# Patient Record
Sex: MALE | Race: WHITE | Employment: OTHER | URBAN - NONMETROPOLITAN AREA
[De-identification: names, ages, dates, MRNs, and addresses within clinical notes are randomized per-mention and may not be internally consistent; named-entity substitution may affect disease eponyms.]

---

## 2022-02-10 ENCOUNTER — OFFICE VISIT (OUTPATIENT)
Dept: ENT CLINIC | Age: 77
End: 2022-02-10
Payer: MEDICARE

## 2022-02-10 VITALS
OXYGEN SATURATION: 98 % | DIASTOLIC BLOOD PRESSURE: 62 MMHG | BODY MASS INDEX: 31.99 KG/M2 | HEIGHT: 69 IN | TEMPERATURE: 97.9 F | SYSTOLIC BLOOD PRESSURE: 138 MMHG | WEIGHT: 216 LBS | RESPIRATION RATE: 14 BRPM | HEART RATE: 58 BPM

## 2022-02-10 DIAGNOSIS — H91.90 HEARING LOSS, UNSPECIFIED HEARING LOSS TYPE, UNSPECIFIED LATERALITY: ICD-10-CM

## 2022-02-10 DIAGNOSIS — H93.13 TINNITUS OF BOTH EARS: Primary | ICD-10-CM

## 2022-02-10 PROCEDURE — 99203 OFFICE O/P NEW LOW 30 MIN: CPT | Performed by: OTOLARYNGOLOGY

## 2022-02-10 RX ORDER — B-COMPLEX WITH VITAMIN C
TABLET ORAL
COMMUNITY
End: 2022-02-15

## 2022-02-10 RX ORDER — LOSARTAN POTASSIUM 100 MG/1
TABLET ORAL
COMMUNITY

## 2022-02-10 RX ORDER — ATORVASTATIN CALCIUM 10 MG/1
TABLET, FILM COATED ORAL DAILY
COMMUNITY
End: 2022-02-10

## 2022-02-10 RX ORDER — CARVEDILOL 12.5 MG/1
12.5 TABLET ORAL 2 TIMES DAILY WITH MEALS
COMMUNITY

## 2022-02-10 RX ORDER — ATORVASTATIN CALCIUM 10 MG/1
TABLET, FILM COATED ORAL
COMMUNITY
End: 2022-02-15

## 2022-02-10 ASSESSMENT — ENCOUNTER SYMPTOMS
SORE THROAT: 0
SINUS PRESSURE: 0
DIARRHEA: 0
CHOKING: 0
CHEST TIGHTNESS: 0
TROUBLE SWALLOWING: 0
RHINORRHEA: 0
NAUSEA: 0
COLOR CHANGE: 0
VOMITING: 0
APNEA: 1
FACIAL SWELLING: 0
WHEEZING: 0
COUGH: 0
ABDOMINAL PAIN: 0
SHORTNESS OF BREATH: 1
VOICE CHANGE: 0
STRIDOR: 0

## 2022-02-10 NOTE — PROGRESS NOTES
[facility] [place] [facility]  [facility] EAR, NOSE AND THROAT  [facility]  Dept: [phone]  Dept Fax: [phone]  Loc: [address] David Montoya is a 64 y.o. male who was referred byMaryan Heath* for:  Chief Complaint   Patient presents with    New Patient     patient is here for tinnitus, bilateral   .    HPI:     Brice Gonzalez is a 64 y.o. male who presents today for relation of tinnitus in both ears. He states it has been bothering him for about a year. He thinks he hears fine. His wife is accompanying him and she says he does not. He does admit to some noise exposure without ear protection. He has not had vertigo pain or ear drainage. History: Allergies   Allergen Reactions    Cephalexin Hives and Rash    Hydroxyzine Hives     Current Outpatient Medications   Medication Sig Dispense Refill    atorvastatin (LIPITOR) 10 MG tablet       losartan (COZAAR) 100 MG tablet       metFORMIN (GLUCOPHAGE) 500 MG tablet       B Complex Vitamins (VITAMIN B COMPLEX 100 IJ)       VITAMIN D, CHOLECALCIFEROL, PO Take by mouth daily      Ascorbic Acid (VITAMIN C) 500 MG/5ML LIQD       Zinc 100 MG TABS       carvedilol (COREG) 12.5 MG tablet Take 12.5 mg by mouth 2 times daily (with meals)       No current facility-administered medications for this visit. History reviewed. No pertinent past medical history. Past Surgical History:   Procedure Laterality Date    PROSTATE BIOPSY  2015    PROSTATECTOMY  0440    UMBILICAL HERNIA REPAIR  2015     History reviewed. No pertinent family history.   Social History     Tobacco Use    Smoking status: Never Smoker    Smokeless tobacco: Never Used   Substance Use Topics    Alcohol use: Yes     Comment: once a week       Subjective:      Review of Systems   Constitutional: Negative for activity change, appetite change, chills, diaphoresis, fatigue, fever and unexpected weight change. HENT: Negative for congestion, dental problem, ear discharge, ear pain, facial swelling, hearing loss, mouth sores, nosebleeds, postnasal drip, rhinorrhea, sinus pressure, sneezing, sore throat, tinnitus, trouble swallowing and voice change. Eyes: Negative for visual disturbance. Respiratory: Positive for apnea and shortness of breath. Negative for cough, choking, chest tightness, wheezing and stridor. Cardiovascular: Negative for chest pain, palpitations and leg swelling. Gastrointestinal: Negative for abdominal pain, diarrhea, nausea and vomiting. Endocrine: Negative for cold intolerance, heat intolerance, polydipsia and polyuria. Genitourinary: Negative for dysuria, enuresis and hematuria. Musculoskeletal: Negative for arthralgias, gait problem, neck pain and neck stiffness. Skin: Negative for color change and rash. Allergic/Immunologic: Negative for environmental allergies, food allergies and immunocompromised state. Neurological: Negative for dizziness, syncope, facial asymmetry, speech difficulty, light-headedness and headaches. Hematological: Negative for adenopathy. Does not bruise/bleed easily. Psychiatric/Behavioral: Negative for confusion and sleep disturbance. The patient is not nervous/anxious. Objective:   /62 (Site: Left Upper Arm, Position: Sitting)   Pulse 58   Temp 97.9 °F (36.6 °C) (Infrared)   Resp 14   Ht 5' 9\" (1.753 m)   Wt 216 lb (98 kg)   SpO2 98%   BMI 31.90 kg/m²     Physical Exam  Vitals and nursing note reviewed. Constitutional:       Appearance: He is well-developed. HENT:      Head: Normocephalic and atraumatic. No laceration. Salivary Glands: Right salivary gland is not diffusely enlarged or tender. Left salivary gland is not diffusely enlarged or tender. Comments:        Right Ear: Hearing, tympanic membrane, ear canal and external ear normal. No drainage or swelling. No middle ear effusion.  Tympanic membrane is not perforated or erythematous. Left Ear: Hearing, tympanic membrane, ear canal and external ear normal. No drainage or swelling. No middle ear effusion. Tympanic membrane is not perforated or erythematous. Nose: Nose normal. No septal deviation ( But narrow at the valve area bilaterally with some drooping of the tip.), mucosal edema or rhinorrhea. Mouth/Throat:      Mouth: Mucous membranes are moist. Mucous membranes are not pale and not dry. No oral lesions. Tongue: No lesions. Pharynx: Oropharynx is clear. Uvula midline. No oropharyngeal exudate or posterior oropharyngeal erythema. Comments: LIps: lips normal     Mallampati 1  Base of tongue: symmetric but large  Mirror exam deferred due to VERY STRONG gag reflex. Eyes:      Extraocular Movements: Extraocular movements intact. Comments: Conjugate gaze   Neck:      Thyroid: No thyroid mass or thyromegaly. Trachea: Phonation normal. No tracheal deviation. Comments:     Pulmonary:      Effort: Pulmonary effort is normal. No retractions. Breath sounds: No stridor. Musculoskeletal:      Cervical back: Normal range of motion and neck supple. Lymphadenopathy:      Cervical: No cervical adenopathy. Neurological:      Mental Status: He is alert and oriented to person, place, and time. Cranial Nerves: Cranial nerves are intact. Cranial nerve deficit: VIIth N function intact bilat. Psychiatric:         Mood and Affect: Mood and affect normal.         Behavior: Behavior is cooperative. Data:  All of the past medical history, past surgical history, family history,social history, allergies and current medications were reviewed with the patient. Assessment & Plan   Diagnoses and all orders for this visit:     Diagnosis Orders   1. Tinnitus of both ears  Audiometry with tympanometry   2.  Hearing loss, unspecified hearing loss type, unspecified laterality  Audiometry with tympanometry       The findings were explained and his questions were answered. He did miss a few of the things I said to him in a normal tone of voice. Suspect his wife has a more accurate history than he does. Recommended basic audiometry, tympanometry, and a return visit. They agreed       Manjinder Agee. Dorothy Reardon MD    **This report has been created using voice recognition software. It may contain minor errors which are inherent in voicerecognition technology. **

## 2022-02-15 ENCOUNTER — HOSPITAL ENCOUNTER (OUTPATIENT)
Dept: AUDIOLOGY | Age: 77
Discharge: HOME | End: 2022-02-15
Payer: MEDICARE

## 2022-02-15 ENCOUNTER — OFFICE VISIT (OUTPATIENT)
Dept: ENT CLINIC | Age: 77
End: 2022-02-15
Payer: MEDICARE

## 2022-02-15 ENCOUNTER — TELEPHONE (OUTPATIENT)
Dept: AUDIOLOGY | Age: 77
End: 2022-02-15

## 2022-02-15 VITALS
RESPIRATION RATE: 12 BRPM | SYSTOLIC BLOOD PRESSURE: 126 MMHG | HEART RATE: 58 BPM | BODY MASS INDEX: 32.44 KG/M2 | WEIGHT: 219.7 LBS | OXYGEN SATURATION: 98 % | TEMPERATURE: 97.4 F | DIASTOLIC BLOOD PRESSURE: 80 MMHG

## 2022-02-15 DIAGNOSIS — H93.13 TINNITUS OF BOTH EARS: Primary | ICD-10-CM

## 2022-02-15 DIAGNOSIS — H90.3 BILATERAL HIGH FREQUENCY SENSORINEURAL HEARING LOSS: ICD-10-CM

## 2022-02-15 PROCEDURE — 99214 OFFICE O/P EST MOD 30 MIN: CPT | Performed by: OTOLARYNGOLOGY

## 2022-02-15 PROCEDURE — 92567 TYMPANOMETRY: CPT | Performed by: AUDIOLOGIST

## 2022-02-15 PROCEDURE — 92557 COMPREHENSIVE HEARING TEST: CPT | Performed by: AUDIOLOGIST

## 2022-02-15 RX ORDER — ATORVASTATIN CALCIUM 20 MG/1
20 TABLET, FILM COATED ORAL DAILY
COMMUNITY

## 2022-02-15 RX ORDER — MULTIVIT WITH MINERALS/LUTEIN
1000 TABLET ORAL DAILY
COMMUNITY

## 2022-02-15 ASSESSMENT — ENCOUNTER SYMPTOMS
FACIAL SWELLING: 0
TROUBLE SWALLOWING: 0
COUGH: 0
VOMITING: 0
VOICE CHANGE: 0
SORE THROAT: 0
COLOR CHANGE: 0
SHORTNESS OF BREATH: 0
DIARRHEA: 0
RHINORRHEA: 0
CHEST TIGHTNESS: 0
CHOKING: 0
NAUSEA: 0
APNEA: 0
SINUS PRESSURE: 0
WHEEZING: 0
STRIDOR: 0
ABDOMINAL PAIN: 0

## 2022-02-15 NOTE — TELEPHONE ENCOUNTER
Called patient regarding hearing aid evaluation. [employer] hearing aid benefit through [employer] [phone]. Advised patient we are not a provider for [employer]. He expressed understanding.

## 2022-02-15 NOTE — PROGRESS NOTES
AUDIOLOGICAL EVALUATION      REASON FOR TESTING:  Audiometric evaluation per the request of Joann Win MD, due to the diagnosis of tinnitus and hearing loss. The patient reports a long-standing, constant but fluctuating, tinnitus in both ears. He denies any otalgia, otorrhea, aural fullness, or vertigo. He denies any history of middle ear pathology or previous ear surgery. He denies any significant noise history or familial hearing loss. OTOSCOPY: Clear external ear canals and visible tympanic membranes, bilaterally. AUDIOGRAM        Reliability: Good  Audiometer Used:  [id number]        COMMENTS: Mild to moderately-severe sensorineural hearing loss int he right ear and a mild to severe mixed hearing loss in the left ear with a slight conductive component noted at 250 and 1000Hz. Speech reception thresholds consistent with pure tone averages, bilaterally. Word recognition scores are excellent, 100% on the right and 96% on the left, with speech presented at slightly elevated conversation levels in quiet. Tympanometry indicated a normal ear canal volume and peak pressure with slightly reduced compliance (0.35ml) on the right. The left tympanogram showed normal canal volume and compliance with slightly negative middle ear pressure (-132daPa). RECOMMENDATION(S):   1. ENT follow up with Dr. Abel Francis today as scheduled. 2. Repeat testing as medically indicated or in 12 months to rule out progression, sooner with any noticeable changes. 3. Consider binaural amplification pending medical clearance and patient motivation. 4. Hearing protection in noise.

## 2022-02-15 NOTE — PROGRESS NOTES
[facility] [place] [facility]  [facility] EAR, NOSE AND THROAT  [facility] [address] [place] [id number]  Dept: [phone]  Dept Fax: [phone]  Loc: [phone]    Danna Mchugh is a 64 y.o. male who was referred byNo ref. provider found for:  Chief Complaint   Patient presents with   Renetta Manual Results     Patient here for results of his audiogram.    . HPI:     Danna Mchugh is a 64 y.o. male who presents today for review of his hearing test.  Bilateral high-frequency sensorineural hearing loss. Lower frequencies were just borderline normal or below. Word discrimination was normal.  He does have trouble understanding people frequently    History: Allergies   Allergen Reactions    Cephalexin Hives and Rash    Hydroxyzine Hives     Current Outpatient Medications   Medication Sig Dispense Refill    atorvastatin (LIPITOR) 20 MG tablet Take 20 mg by mouth daily      Ascorbic Acid (VITAMIN C) 1000 MG tablet Take 1,000 mg by mouth daily      Cholecalciferol (VITAMIN D3) 25 MCG (1000 UT) CAPS Take by mouth      zinc 50 MG CAPS Take by mouth      losartan (COZAAR) 100 MG tablet       metFORMIN (GLUCOPHAGE) 500 MG tablet       B Complex Vitamins (VITAMIN B COMPLEX 100 IJ)       carvedilol (COREG) 12.5 MG tablet Take 12.5 mg by mouth 2 times daily (with meals)       No current facility-administered medications for this visit. History reviewed. No pertinent past medical history. Past Surgical History:   Procedure Laterality Date    PROSTATE BIOPSY  2015    PROSTATECTOMY  8241    UMBILICAL HERNIA REPAIR  2015     History reviewed. No pertinent family history.   Social History     Tobacco Use    Smoking status: Never Smoker    Smokeless tobacco: Never Used   Substance Use Topics    Alcohol use: Yes     Comment: once a week       Subjective:      Review of Systems   Constitutional: Negative for activity change, appetite change, chills, diaphoresis, fatigue, fever and unexpected weight change. HENT: Negative for congestion, dental problem, ear discharge, ear pain, facial swelling, hearing loss, mouth sores, nosebleeds, postnasal drip, rhinorrhea, sinus pressure, sneezing, sore throat, tinnitus, trouble swallowing and voice change. Eyes: Negative for visual disturbance. Respiratory: Negative for apnea, cough, choking, chest tightness, shortness of breath, wheezing and stridor. Cardiovascular: Negative for chest pain, palpitations and leg swelling. Gastrointestinal: Negative for abdominal pain, diarrhea, nausea and vomiting. Endocrine: Negative for cold intolerance, heat intolerance, polydipsia and polyuria. Genitourinary: Negative for dysuria, enuresis and hematuria. Musculoskeletal: Negative for arthralgias, gait problem, neck pain and neck stiffness. Skin: Negative for color change and rash. Allergic/Immunologic: Negative for environmental allergies, food allergies and immunocompromised state. Neurological: Negative for dizziness, syncope, facial asymmetry, speech difficulty, light-headedness and headaches. Hematological: Negative for adenopathy. Does not bruise/bleed easily. Psychiatric/Behavioral: Negative for confusion and sleep disturbance. The patient is not nervous/anxious. Objective:   /80 (Site: Left Upper Arm, Position: Sitting)   Pulse 58   Temp 97.4 °F (36.3 °C) (Infrared)   Resp 12   Wt 219 lb 11.2 oz (99.7 kg)   SpO2 98%   BMI 32.44 kg/m²     Physical Exam   Ears: Normal appearance    Data:  All of the past medical history, past surgical history, family history,social history, allergies and current medications were reviewed with the patient. Assessment & Plan   Diagnoses and all orders for this visit:     Diagnosis Orders   1. Tinnitus of both ears  Hearing aid biaural evaluation   2.  Bilateral high frequency sensorineural hearing loss  Hearing aid biaural evaluation       The findings were explained and his questions were answered. Explained the effect of complication and the tinnitus both in reducing the increased gain from lack of input as well as masking the sound out. Given his significant hearing loss, he is willing to do a hearing aid trial.      Return if symptoms worsen or fail to improve. Flavia Garzon. Adalberto Mcpherson MD    **This report has been created using voice recognition software. It may contain minor errors which are inherent in voicerecognition technology. **